# Patient Record
Sex: FEMALE
[De-identification: names, ages, dates, MRNs, and addresses within clinical notes are randomized per-mention and may not be internally consistent; named-entity substitution may affect disease eponyms.]

---

## 2018-02-03 ENCOUNTER — HOSPITAL ENCOUNTER (EMERGENCY)
Dept: HOSPITAL 14 - H.ER | Age: 38
Discharge: HOME | End: 2018-02-03
Payer: MEDICAID

## 2018-02-03 VITALS
OXYGEN SATURATION: 98 % | RESPIRATION RATE: 20 BRPM | HEART RATE: 71 BPM | SYSTOLIC BLOOD PRESSURE: 137 MMHG | TEMPERATURE: 97.7 F | DIASTOLIC BLOOD PRESSURE: 96 MMHG

## 2018-02-03 VITALS — BODY MASS INDEX: 28.8 KG/M2

## 2018-02-03 DIAGNOSIS — M79.606: Primary | ICD-10-CM

## 2018-02-03 DIAGNOSIS — F41.9: ICD-10-CM

## 2018-02-03 DIAGNOSIS — F32.9: ICD-10-CM

## 2018-02-03 DIAGNOSIS — G89.29: ICD-10-CM

## 2018-02-03 NOTE — ED PDOC
HPI: General Adult


Time Seen by Provider: 02/03/18 17:06


Chief Complaint (Nursing): Lower Extremity Problem/Injury


Chief Complaint (Provider): Rigth calf cramping


History Per: Patient


History/Exam Limitations: no limitations


Onset/Duration Of Symptoms: Days (3-4 days)


Current Symptoms Are (Timing): Still Present


Additional Complaint(s): 





37 year old female presents to the emergency department with an atraumatic 

right calf cramping x3-4 days that worsens at night. Denies shortnress of breath

, chest pain, hemoptysis, numbness, tingling, and history of deep vein 

thrombosis (DVT) or pulmonary embolism (PE). Patient states she had varicose 

vein surgery about 2-3 years ago. 











PMD: Dr. Galileo Shaw MD 





Past Medical History


Reviewed: Historical Data, Nursing Documentation, Vital Signs


Vital Signs: 


 Last Vital Signs











Temp  97.7 F   02/03/18 16:15


 


Pulse  71   02/03/18 16:15


 


Resp  20   02/03/18 16:15


 


BP  137/96 H  02/03/18 16:15


 


Pulse Ox  98   02/03/18 18:57














- Medical History


PMH: Anxiety, Arthritis, Back Problems, Depression, Chronic Pain


   Denies: Diabetes, Hepatitis, HIV, HTN, Chronic Kidney Disease, Seizures, 

Sexually Transmitted Disease





- Surgical History


Other surgeries: Varicose Vein surgery





- Family History


Family History: States: Unknown Family Hx





- Social History


Current smoker - smoking cessation education provided: Yes


Alcohol: None


Drugs: Denies





- Home Medications


Home Medications: 


 Ambulatory Orders











 Medication  Instructions  Recorded


 


Cyclobenzaprine [Cyclobenzaprine 10 mg PO PRN PRN 04/04/16





HCl]  


 


Cyclobenzaprine [Flexeril] 5 mg PO Q6H PRN #60 tab 04/08/16


 


Docusate [Colace] 100 mg PO DAILY #30 cap 04/08/16


 


Ferrous Sulfate [Feosol] 325 mg PO TID #90 tab 04/08/16


 


Gabapentin [Neurontin] 300 mg PO TID #90 cap 04/08/16


 


hydrOXYzine HCl [Atarax] 25 mg PO Q4H PRN #30 tab 04/08/16


 


traZODone [Desyrel] 100 mg PO HS PRN #30 tab 04/08/16














- Allergies


Allergies/Adverse Reactions: 


 Allergies











Allergy/AdvReac Type Severity Reaction Status Date / Time


 


No Known Allergies Allergy   Verified 02/03/18 16:15














Review of Systems


ROS Statement: Except As Marked, All Systems Reviewed And Found Negative (As 

per HPI, otherwise negative)


Constitutional: Negative for: Other (Hx of DVT or PE)


Cardiovascular: Negative for: Chest Pain


Respiratory: Negative for: Shortness of Breath, Hemoptysis


Musculoskeletal: Positive for: Other (Right calf cramping)


Neurological: Negative for: Numbness (tingling)





Physical Exam





- Reviewed


Nursing Documentation Reviewed: Yes


Vital Signs Reviewed: Yes





- Physical Exam


Appears: Positive for: No Acute Distress


Head Exam: Positive for: NORMAL INSPECTION


Skin: Positive for: Normal Color, Warm, Dry


Cardiovascular/Chest: Positive for: Regular Rate, Rhythm.  Negative for: Murmur


Respiratory: Positive for: Normal Breath Sounds.  Negative for: Accessory 

Muscle Use, Respiratory Distress


Pulses-Dorsalis Pedis (L): 2+


Pulses-Dorsalis Pedis (R): 2+


Extremity: Positive for: Normal ROM, Other (positive varicose veins to right leg

; R leg without swelling, tenderness, or break in skin integrity).  Negative for

: Pedal Edema, Calf Tenderness, Swelling


Neurologic/Psych: Positive for: Alert, Oriented (x3), Gait (Steady).  Negative 

for: Motor/Sensory Deficits, Facial Droop





- ECG


O2 Sat by Pulse Oximetry: 98 (RA)


Pulse Ox Interpretation: Normal





Medical Decision Making


Medical Decision Making: 





Time: 1734


Initial impression: Right calf cramping


Initial plan:


--Duplex US 


--Reevaluation 





Time: 1811


--Duplex US 


FINDINGS:





COMMON FEMORAL VEIN:


Unremarkable.





SUPERFICIAL FEMORAL VEIN:


Unremarkable.





POPLITEAL VEIN:


Unremarkable.





POSTERIOR TIBIAL VEIN:


Unremarkable.





OTHER FINDINGS:


None.





IMPRESSION:


No evidence of deep venous thrombosis in the right lower extremity.





Time: 1834


--Naprosyn 500 mg PO





Time: 1841


--Patient is medically stable for discharge. Advised to follow up with PMD. 





Clinical Impression: Calf Pain 





Scribe Attestation:


Documented by Penelope Raymond, acting as a scribe for Brandon Barton PA-C





Provider Scribe Attestation:


All medical record entries made by the Scribe were at my direction and 

personally dictated by me. I have reviewed the chart and agree that the record 

accurately reflects my personal performance of the history, physical exam, 

medical decision making, and the department course for this patient. I have 

also personally directed, reviewed, and agree with the discharge instructions 

and disposition.





Disposition





- Clinical Impression


Clinical Impression: 


 Calf pain








- Patient ED Disposition


Is Patient to be Admitted: No


Counseled Patient/Family Regarding: Need For Followup





- Disposition


Disposition: Routine/Home


Disposition Time: 18:41


Condition: STABLE


Instructions:  Leg Pain (ED)


Forms:  [facility] (English)


Print Language: ENGLISH

## 2018-02-03 NOTE — US
PROCEDURE:  Right lower extremity venous duplex Doppler. 



HISTORY:

pain







COMPARISON:

None available.



TECHNIQUE:

Common femoral, superficial femoral, popliteal and posterior tibial 

veins were evaluated. Flow was assessed with color Doppler, 

compressibility, assessment of phasic flow and augmentation response. 



FINDINGS:



COMMON FEMORAL VEIN:

Unremarkable.



SUPERFICIAL FEMORAL VEIN:

Unremarkable.



POPLITEAL VEIN:

Unremarkable.



POSTERIOR TIBIAL VEIN:

Unremarkable.



OTHER FINDINGS:

None.



IMPRESSION:

No evidence of deep venous thrombosis in the right lower extremity.